# Patient Record
Sex: MALE | Race: WHITE
[De-identification: names, ages, dates, MRNs, and addresses within clinical notes are randomized per-mention and may not be internally consistent; named-entity substitution may affect disease eponyms.]

---

## 2018-02-13 ENCOUNTER — HOSPITAL ENCOUNTER (OUTPATIENT)
Dept: HOSPITAL 7 - FB.SDS | Age: 63
Discharge: HOME | End: 2018-02-13
Attending: SURGERY
Payer: MEDICARE

## 2018-02-13 DIAGNOSIS — K29.80: Primary | ICD-10-CM

## 2018-02-13 DIAGNOSIS — Z79.899: ICD-10-CM

## 2018-02-13 DIAGNOSIS — F17.210: ICD-10-CM

## 2018-02-13 DIAGNOSIS — Z79.82: ICD-10-CM

## 2018-02-13 DIAGNOSIS — K26.9: ICD-10-CM

## 2018-02-13 DIAGNOSIS — F41.9: ICD-10-CM

## 2018-02-13 DIAGNOSIS — F32.9: ICD-10-CM

## 2018-02-13 PROCEDURE — 43239 EGD BIOPSY SINGLE/MULTIPLE: CPT

## 2018-02-13 PROCEDURE — 82962 GLUCOSE BLOOD TEST: CPT

## 2018-02-13 PROCEDURE — 88305 TISSUE EXAM BY PATHOLOGIST: CPT

## 2018-02-13 NOTE — OR
DATE OF OPERATION:  02/13/2018

 

SURGEON:  Raman Salcedo MD

 

PROCEDURES PERFORMED:  EGD with cold forceps biopsy.

 

PREOPERATIVE DIAGNOSES:  History of melena and epigastric abdominal pain.

 

POSTOPERATIVE DIAGNOSIS:  Duodenal ulcer.

 

INDICATIONS FOR PROCEDURE:  This is a 62-year-old white male who is referred

with the above-mentioned history of some epigastric abdominal pain as well as

some melenic stools.  He was offered and accepted an EGD.

 

DESCRIPTION OF PROCEDURE:  After an excellent IV sedation was administered, the

bite block was inserted.  Flexible endoscope was passed down the patient's

esophagus into the stomach.  The stomach was insufflated.  Scope was passed

through the pylorus to the second portion of the duodenum.  The following

findings were noted.  In the first portion of the duodenum, a duodenal ulcer was

noted.  Biopsies were taken along the edge.  He had a thick bed of exudate with

no evident sequelae of recent bleed.  The stomach was essentially unremarkable,

except for some retained food.  The esophagus was also unremarkable.  The

stomach was deflated, and the scope was removed.  The patient tolerated the

procedure well.

 

Job#: [id number]

DD: 02/13/2018 0942

DT: 02/13/2018 1024 AS/MODL

## 2018-02-13 NOTE — PCM.OPNOTE
- General Post-Op/Procedure Note


Date of Surgery/Procedure: 02/13/18


Operative Procedure(s): egd with bx


Findings: 





duodenal ulcer 


Pre Op Diagnosis: hx of melena


Post-Op Diagnosis: duodenal ulcer


Anesthesia Technique: MAC


Primary Surgeon: Raman Salcedo


Anesthesia Provider: Maryjo Ramires


Pathology: 





ulcer edge 


Complications: None


Condition: Good


Free Text/Narrative:: 





see dictation

## 2018-02-13 NOTE — PREOP
ADMISSION DATE:  02/13/2018

 

CHIEF COMPLAINT:  Black stools.

 

HISTORY OF PRESENT ILLNESS:  This is a 62-year-old white male who is referred

with a history of melena.  He has had some black foul-smelling bowel movements,

had some pain in the epigastrium as well.  His black stools have apparently

tapered off in the month since I have seen him.  He has had a history of

perforated ulcer in the past and does have an abdominal wall history.  He

presents now for an EGD.

 

MEDICATIONS:

1. Neurontin 300 mg 3 times a day.

2. Albuterol inhaler 2 puffs daily over 4 to 6 hours as needed.

3. Aspirin 81 mg daily.

4. Zoloft, 50-mg tablets, 75 mg one time per day.

5. Ibuprofen  mg twice a day as needed for pain.

6. Multivitamin 1 per day.

7. Prednisone 10 mg 4 tablets.  He is currently not on that medication

    anymore.

8. He is on oxycodone as needed.

9. Prilosec 20 mg.

 

ALLERGIES:  He has no known drug allergies.

 

PAST MEDICAL HISTORY:  Significant for back pain, lumbar stenosis, degenerative

joint disease, scoliosis of the lumbar spine, gastric ulcer, anxiety, arthritis,

depression, and neuropathy.

 

PAST SURGICAL HISTORY:  He has had back surgery, hernia repair, as well as neck

surgery, and tonsillectomy.

 

FAMILY HISTORY:  Significant for ADD with his mother, cancer, heart disease and

hypertension.

 

SOCIAL HISTORY:  He is .  He is a 2.5-pack-per-day smoker.  Does not

drink.  Does use marijuana.

 

REVIEW OF SYSTEMS:  HEENT:  Positive for dental problems and trouble swallowing.

EYES:  Negative.  RESPIRATORY:  Negative.  CARDIOVASCULAR:  Negative.

GASTROINTESTINAL:  Positive for history for black stools and abdominal pain.

MUSCULOSKELETAL:  Positive for back pain, neck pain, and neck stiffness.

 

PHYSICAL EXAMINATION:  GENERAL:  This is a well-developed and well-nourished

white male, appearing in no acute distress.  HEENT:  Grossly within normal

limits.  LUNGS:  Clear to auscultation.  HEART:  Regular rate and rhythm.

ABDOMEN:  Soft.  He has a reducible hernia in his abdomen, near the area of the

umbilicus.

 

ASSESSMENT:  History of melenic stools, gastric ulcer, and abdominal pain.

 

PLAN:  EGD.  Procedure and risks explained to the patient to include bleeding,

infection, and perforation.  The patient expresses understanding, and he asked

us to proceed.

 

Job#: [id number]

DD: 02/13/2018 0853

DT: 02/13/2018 0920 AS/MODL

## 2018-04-02 ENCOUNTER — HOSPITAL ENCOUNTER (OUTPATIENT)
Dept: HOSPITAL 7 - FB.ED | Age: 63
Setting detail: OBSERVATION
LOS: 2 days | Discharge: HOME | End: 2018-04-04
Attending: FAMILY MEDICINE | Admitting: FAMILY MEDICINE
Payer: MEDICARE

## 2018-04-02 DIAGNOSIS — F32.9: ICD-10-CM

## 2018-04-02 DIAGNOSIS — F17.210: ICD-10-CM

## 2018-04-02 DIAGNOSIS — K26.9: Primary | ICD-10-CM

## 2018-04-02 DIAGNOSIS — E66.01: ICD-10-CM

## 2018-04-02 PROCEDURE — G0378 HOSPITAL OBSERVATION PER HR: HCPCS

## 2018-04-02 PROCEDURE — C9113 INJ PANTOPRAZOLE SODIUM, VIA: HCPCS
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
## 2018-04-03 RX ADMIN — BUDESONIDE AND FORMOTEROL FUMARATE DIHYDRATE SCH MG: 160; 4.5 AEROSOL RESPIRATORY (INHALATION) at 14:57

## 2018-04-03 RX ADMIN — SUCRALFATE SCH GM: 1 TABLET ORAL at 17:17

## 2018-04-03 RX ADMIN — BUDESONIDE AND FORMOTEROL FUMARATE DIHYDRATE SCH MG: 160; 4.5 AEROSOL RESPIRATORY (INHALATION) at 20:05

## 2018-04-03 RX ADMIN — SUCRALFATE SCH GM: 1 TABLET ORAL at 20:04

## 2018-04-03 NOTE — PCM.OPNOTE
- General Post-Op/Procedure Note


Date of Surgery/Procedure: 04/03/18


Operative Procedure(s): egd


Findings: 





duodenal ulcer x2, 1 cm and 2 cm in diameter 


no active bleeding, no blood clot, no visible vessel


Pre Op Diagnosis: hx of duodenal ulcer


Post-Op Diagnosis: duodenal ulcer x2


Anesthesia Technique: MAC


Primary Surgeon: Raman Salcedo


Anesthesia Provider: Michael Amador


Pathology: 





none 





Complications: None


Condition: Fair


Free Text/Narrative:: 


 Intake & Output











 04/02/18 04/03/18 04/03/18





 22:59 06:59 14:59


 


Intake Total  3200 


 


Balance  3200 








see dictation
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
## 2018-04-04 RX ADMIN — BUDESONIDE AND FORMOTEROL FUMARATE DIHYDRATE SCH: 160; 4.5 AEROSOL RESPIRATORY (INHALATION) at 14:58

## 2018-04-04 RX ADMIN — SUCRALFATE SCH GM: 1 TABLET ORAL at 06:41

## 2018-04-04 RX ADMIN — SUCRALFATE SCH GM: 1 TABLET ORAL at 11:14

## 2018-04-04 RX ADMIN — BUDESONIDE AND FORMOTEROL FUMARATE DIHYDRATE SCH MG: 160; 4.5 AEROSOL RESPIRATORY (INHALATION) at 09:19
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
## 2018-04-05 NOTE — DISCH
DISCHARGE DATE:  04/04/2018

 

HOSPITAL COURSE:  James Ivey is a 62-year-old  male, admitted with

complicated abdominal pain, repetitive vomiting, suspicion for hematemesis.

Stable upon admission.  Please see interval notes.  Underwent endoscopy on

04/03/2018, found new ulcer findings in the duodenum.  Compliance with

medication was a clinical concern.  IV fluids were maintained.  Laboratory

studies remained stable, hemoglobin 12.9 with slightly microcytic indices.

Electrolytes were otherwise satisfactory, mildly elevated liver enzymes.  He was

watched overnight after the procedure, diet was advanced.  He was comfortable,

pain was improved, back pain a primary issue.  Not a candidate for NSAIDs,

tramadol 50 mg one p.o. t.i.d., #30, with refill.

 

Encouraged at great length to take his medications, both the generic Carafate

and generic omeprazole on a regular basis.  Avoiding caffeine, alcohol, and

complicating foods.  Eat regularly and moderate activity.  Watch fullness for

stools and intervention.

 

PHYSICAL EXAMINATION:  VITAL SIGNS:  Discharge vitals; 36.5, 72, and 120/76.

GENERAL:  Appears comfortable.  Soft spoken.  NECK:  Benign.  Thyroid small.

CHEST:  Clear in all lung fields.  HEART:  Regular without ectopy or murmur.

Distant heart sounds.  ABDOMEN:  Benign.  No tenderness.

 

ASSESSMENT:  Upper gastrointestinal bleed suspected, recurrent ulcer disease.

 

PLAN:  Discussed implications and concerns, surgery may be the next alternative.

Carafate 4 times a day and Protonix 40 mg one daily.  Dietary restrictions and

limitations.  Prescriptions provided accordingly.

 

FOLLOWUP VISIT:  With Dr. Salcedo in 2 weeks' time.

 

Job#: [id number]

DD: 04/04/2018 1055

DT: 04/05/2018 0929 /MARIANNE

## 2018-04-09 NOTE — OR
DATE OF OPERATION:  04/03/2018

 

SURGEON:  Raman Salcedo MD

 

PROCEDURE PERFORMED:  Esophagogastroduodenoscopy.

 

PREOPERATIVE DIAGNOSIS:  History of duodenal ulcer and hematemesis.

 

POSTOPERATIVE DIAGNOSIS:  Duodenal ulcer x2, one in 2 cm in diameter.

 

INDICATIONS FOR PROCEDURE:  Mr. Ivey is a 62-year-old white male, who is well

known to me and underwent an upper endoscopy in February of this year.  He has a

known history of a duodenal ulcer.  He was sent home on Carafate as well as

omeprazole and on followup was doing quite well.  On the evening before his

endoscopy, he was brought in via ambulance with a history of hematemesis.  On

questioning the patient, he has been fairly noncompliant with his medications;

however, he was offered and accepted an EGD.

 

DESCRIPTION OF PROCEDURE:  After an excellent IV sedation was administered, the

bite block was inserted.  The flexible endoscope was passed without difficulty

down the patient's esophagus into the stomach.  The stomach was insufflated.

Scope was passed through the pylorus to the 2nd portion of the duodenum and

slowly withdrawn.  The following findings were noted:  The area between the 1st

and 2nd portion of the duodenum, the original ulcer was identified and proximal

to this a new ulcer has been identified approximately 1 cm in diameter.  No clot

visible vessels or other signs of active bleeding was identified.  No biopsies

were taken.  The stomach was unremarkable.  The distal esophagus was

unremarkable.  The patient was taken back to the floor in good condition.

Recommendations on discussion with the hospitalist was that surgical referral be

considered as this patient is noncompliant.  Given his 400 pounds, he is not a

good surgical candidate for definitive surgical intervention for this condition.

 

Job#: [id number]

DD: 04/09/2018 0821

DT: 04/09/2018 0836 AS/MODL
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
